# Patient Record
Sex: MALE | Race: WHITE | Employment: FULL TIME | URBAN - METROPOLITAN AREA
[De-identification: names, ages, dates, MRNs, and addresses within clinical notes are randomized per-mention and may not be internally consistent; named-entity substitution may affect disease eponyms.]

---

## 2021-07-24 ENCOUNTER — APPOINTMENT (OUTPATIENT)
Dept: ULTRASOUND IMAGING | Age: 66
DRG: 305 | End: 2021-07-24
Attending: INTERNAL MEDICINE
Payer: COMMERCIAL

## 2021-07-24 ENCOUNTER — APPOINTMENT (OUTPATIENT)
Dept: GENERAL RADIOLOGY | Age: 66
DRG: 305 | End: 2021-07-24
Attending: EMERGENCY MEDICINE
Payer: COMMERCIAL

## 2021-07-24 ENCOUNTER — APPOINTMENT (OUTPATIENT)
Dept: CT IMAGING | Age: 66
DRG: 305 | End: 2021-07-24
Attending: EMERGENCY MEDICINE
Payer: COMMERCIAL

## 2021-07-24 ENCOUNTER — HOSPITAL ENCOUNTER (INPATIENT)
Age: 66
LOS: 2 days | Discharge: HOME | DRG: 305 | End: 2021-07-26
Attending: EMERGENCY MEDICINE | Admitting: INTERNAL MEDICINE
Payer: COMMERCIAL

## 2021-07-24 DIAGNOSIS — R10.13 ABDOMINAL PAIN, EPIGASTRIC: ICD-10-CM

## 2021-07-24 DIAGNOSIS — I16.0 HYPERTENSIVE URGENCY: Primary | ICD-10-CM

## 2021-07-24 PROBLEM — I16.1 HYPERTENSIVE EMERGENCY: Status: ACTIVE | Noted: 2021-07-24

## 2021-07-24 LAB
ALBUMIN SERPL-MCNC: 4.4 G/DL (ref 3.5–5)
ALBUMIN/GLOB SERPL: 1.1 {RATIO} (ref 1.1–2.2)
ALP SERPL-CCNC: 40 U/L (ref 45–117)
ALT SERPL-CCNC: 17 U/L (ref 12–78)
ANION GAP SERPL CALC-SCNC: 14 MMOL/L (ref 5–15)
AST SERPL W P-5'-P-CCNC: 17 U/L (ref 15–37)
BASOPHILS # BLD: 0 K/UL (ref 0–0.1)
BASOPHILS NFR BLD: 0 % (ref 0–1)
BILIRUB SERPL-MCNC: 0.6 MG/DL (ref 0.2–1)
BUN SERPL-MCNC: 10 MG/DL (ref 6–20)
BUN/CREAT SERPL: 9 (ref 12–20)
CA-I BLD-MCNC: 9.6 MG/DL (ref 8.5–10.1)
CHLORIDE SERPL-SCNC: 99 MMOL/L (ref 97–108)
CO2 SERPL-SCNC: 26 MMOL/L (ref 21–32)
CREAT SERPL-MCNC: 1.08 MG/DL (ref 0.7–1.3)
D DIMER PPP FEU-MCNC: 0.52 MG/L FEU (ref 0.19–0.5)
DIFFERENTIAL METHOD BLD: ABNORMAL
EOSINOPHIL # BLD: 0 K/UL (ref 0–0.4)
EOSINOPHIL NFR BLD: 0 % (ref 0–7)
ERYTHROCYTE [DISTWIDTH] IN BLOOD BY AUTOMATED COUNT: 11.7 % (ref 11.5–14.5)
GLOBULIN SER CALC-MCNC: 3.9 G/DL (ref 2–4)
GLUCOSE SERPL-MCNC: 122 MG/DL (ref 65–100)
HCT VFR BLD AUTO: 41.1 % (ref 36.6–50.3)
HGB BLD-MCNC: 14.8 G/DL (ref 12.1–17)
IMM GRANULOCYTES # BLD AUTO: 0 K/UL (ref 0–0.04)
IMM GRANULOCYTES NFR BLD AUTO: 0 % (ref 0–0.5)
LIPASE SERPL-CCNC: 132 U/L (ref 73–393)
LYMPHOCYTES # BLD: 0.8 K/UL (ref 0.8–3.5)
LYMPHOCYTES NFR BLD: 8 % (ref 12–49)
MCH RBC QN AUTO: 32.6 PG (ref 26–34)
MCHC RBC AUTO-ENTMCNC: 36 G/DL (ref 30–36.5)
MCV RBC AUTO: 90.5 FL (ref 80–99)
MONOCYTES # BLD: 0.7 K/UL (ref 0–1)
MONOCYTES NFR BLD: 7 % (ref 5–13)
NEUTS SEG # BLD: 8.2 K/UL (ref 1.8–8)
NEUTS SEG NFR BLD: 85 % (ref 32–75)
PLATELET # BLD AUTO: 209 K/UL (ref 150–400)
PMV BLD AUTO: 8.8 FL (ref 8.9–12.9)
POTASSIUM SERPL-SCNC: 4 MMOL/L (ref 3.5–5.1)
PROT SERPL-MCNC: 8.3 G/DL (ref 6.4–8.2)
RBC # BLD AUTO: 4.54 M/UL (ref 4.1–5.7)
SODIUM SERPL-SCNC: 139 MMOL/L (ref 136–145)
TROPONIN I SERPL-MCNC: <0.05 NG/ML
WBC # BLD AUTO: 9.8 K/UL (ref 4.1–11.1)

## 2021-07-24 PROCEDURE — 96365 THER/PROPH/DIAG IV INF INIT: CPT

## 2021-07-24 PROCEDURE — 96366 THER/PROPH/DIAG IV INF ADDON: CPT

## 2021-07-24 PROCEDURE — 83690 ASSAY OF LIPASE: CPT

## 2021-07-24 PROCEDURE — 93005 ELECTROCARDIOGRAM TRACING: CPT

## 2021-07-24 PROCEDURE — 74011000250 HC RX REV CODE- 250: Performed by: INTERNAL MEDICINE

## 2021-07-24 PROCEDURE — 84484 ASSAY OF TROPONIN QUANT: CPT

## 2021-07-24 PROCEDURE — 74011250637 HC RX REV CODE- 250/637: Performed by: EMERGENCY MEDICINE

## 2021-07-24 PROCEDURE — 65610000006 HC RM INTENSIVE CARE

## 2021-07-24 PROCEDURE — 96375 TX/PRO/DX INJ NEW DRUG ADDON: CPT

## 2021-07-24 PROCEDURE — 99284 EMERGENCY DEPT VISIT MOD MDM: CPT

## 2021-07-24 PROCEDURE — 71045 X-RAY EXAM CHEST 1 VIEW: CPT

## 2021-07-24 PROCEDURE — 80053 COMPREHEN METABOLIC PANEL: CPT

## 2021-07-24 PROCEDURE — 74011000250 HC RX REV CODE- 250: Performed by: EMERGENCY MEDICINE

## 2021-07-24 PROCEDURE — 85025 COMPLETE CBC W/AUTO DIFF WBC: CPT

## 2021-07-24 PROCEDURE — 74011250636 HC RX REV CODE- 250/636: Performed by: INTERNAL MEDICINE

## 2021-07-24 PROCEDURE — 96374 THER/PROPH/DIAG INJ IV PUSH: CPT

## 2021-07-24 PROCEDURE — 85379 FIBRIN DEGRADATION QUANT: CPT

## 2021-07-24 PROCEDURE — 76705 ECHO EXAM OF ABDOMEN: CPT

## 2021-07-24 PROCEDURE — 96376 TX/PRO/DX INJ SAME DRUG ADON: CPT

## 2021-07-24 PROCEDURE — 74174 CTA ABD&PLVS W/CONTRAST: CPT

## 2021-07-24 PROCEDURE — 74011250636 HC RX REV CODE- 250/636: Performed by: EMERGENCY MEDICINE

## 2021-07-24 PROCEDURE — 74011000636 HC RX REV CODE- 636: Performed by: INTERNAL MEDICINE

## 2021-07-24 PROCEDURE — 36415 COLL VENOUS BLD VENIPUNCTURE: CPT

## 2021-07-24 PROCEDURE — 74011250637 HC RX REV CODE- 250/637: Performed by: INTERNAL MEDICINE

## 2021-07-24 PROCEDURE — C9113 INJ PANTOPRAZOLE SODIUM, VIA: HCPCS | Performed by: INTERNAL MEDICINE

## 2021-07-24 RX ORDER — ONDANSETRON 2 MG/ML
4 INJECTION INTRAMUSCULAR; INTRAVENOUS
Status: DISCONTINUED | OUTPATIENT
Start: 2021-07-24 | End: 2021-07-26 | Stop reason: HOSPADM

## 2021-07-24 RX ORDER — LABETALOL HCL 20 MG/4 ML
20 SYRINGE (ML) INTRAVENOUS
Status: DISCONTINUED | OUTPATIENT
Start: 2021-07-24 | End: 2021-07-26 | Stop reason: HOSPADM

## 2021-07-24 RX ORDER — SODIUM CHLORIDE 9 MG/ML
100 INJECTION, SOLUTION INTRAVENOUS CONTINUOUS
Status: DISCONTINUED | OUTPATIENT
Start: 2021-07-24 | End: 2021-07-26 | Stop reason: HOSPADM

## 2021-07-24 RX ORDER — SODIUM CHLORIDE 0.9 % (FLUSH) 0.9 %
5-40 SYRINGE (ML) INJECTION EVERY 8 HOURS
Status: DISCONTINUED | OUTPATIENT
Start: 2021-07-24 | End: 2021-07-26 | Stop reason: HOSPADM

## 2021-07-24 RX ORDER — MORPHINE SULFATE 4 MG/ML
4 INJECTION INTRAVENOUS
Status: COMPLETED | OUTPATIENT
Start: 2021-07-24 | End: 2021-07-24

## 2021-07-24 RX ORDER — HYDROMORPHONE HYDROCHLORIDE 1 MG/ML
1 INJECTION, SOLUTION INTRAMUSCULAR; INTRAVENOUS; SUBCUTANEOUS ONCE
Status: COMPLETED | OUTPATIENT
Start: 2021-07-24 | End: 2021-07-24

## 2021-07-24 RX ORDER — SUCRALFATE 1 G/1
1 TABLET ORAL
Status: COMPLETED | OUTPATIENT
Start: 2021-07-24 | End: 2021-07-24

## 2021-07-24 RX ORDER — FAMOTIDINE 10 MG/ML
20 INJECTION INTRAVENOUS
Status: COMPLETED | OUTPATIENT
Start: 2021-07-24 | End: 2021-07-24

## 2021-07-24 RX ORDER — NITROGLYCERIN 20 MG/100ML
5-20 INJECTION INTRAVENOUS
Status: DISCONTINUED | OUTPATIENT
Start: 2021-07-24 | End: 2021-07-24

## 2021-07-24 RX ORDER — ENOXAPARIN SODIUM 100 MG/ML
40 INJECTION SUBCUTANEOUS EVERY 24 HOURS
Status: DISCONTINUED | OUTPATIENT
Start: 2021-07-24 | End: 2021-07-26 | Stop reason: HOSPADM

## 2021-07-24 RX ORDER — MORPHINE SULFATE 2 MG/ML
2 INJECTION, SOLUTION INTRAMUSCULAR; INTRAVENOUS
Status: DISCONTINUED | OUTPATIENT
Start: 2021-07-24 | End: 2021-07-25

## 2021-07-24 RX ORDER — LISINOPRIL AND HYDROCHLOROTHIAZIDE 10; 12.5 MG/1; MG/1
1 TABLET ORAL DAILY
COMMUNITY

## 2021-07-24 RX ORDER — KETOROLAC TROMETHAMINE 30 MG/ML
15 INJECTION, SOLUTION INTRAMUSCULAR; INTRAVENOUS
Status: DISCONTINUED | OUTPATIENT
Start: 2021-07-24 | End: 2021-07-24

## 2021-07-24 RX ORDER — HYDROMORPHONE HYDROCHLORIDE 1 MG/ML
1 INJECTION, SOLUTION INTRAMUSCULAR; INTRAVENOUS; SUBCUTANEOUS
Status: COMPLETED | OUTPATIENT
Start: 2021-07-24 | End: 2021-07-24

## 2021-07-24 RX ORDER — SODIUM CHLORIDE 0.9 % (FLUSH) 0.9 %
5-40 SYRINGE (ML) INJECTION AS NEEDED
Status: DISCONTINUED | OUTPATIENT
Start: 2021-07-24 | End: 2021-07-26 | Stop reason: HOSPADM

## 2021-07-24 RX ORDER — NITROGLYCERIN 20 MG/100ML
0-20 INJECTION INTRAVENOUS
Status: DISCONTINUED | OUTPATIENT
Start: 2021-07-24 | End: 2021-07-26 | Stop reason: HOSPADM

## 2021-07-24 RX ORDER — HYDROMORPHONE HYDROCHLORIDE 1 MG/ML
2 INJECTION, SOLUTION INTRAMUSCULAR; INTRAVENOUS; SUBCUTANEOUS
Status: COMPLETED | OUTPATIENT
Start: 2021-07-24 | End: 2021-07-25

## 2021-07-24 RX ORDER — DIPHENHYDRAMINE HYDROCHLORIDE 50 MG/ML
25 INJECTION, SOLUTION INTRAMUSCULAR; INTRAVENOUS
Status: COMPLETED | OUTPATIENT
Start: 2021-07-24 | End: 2021-07-24

## 2021-07-24 RX ADMIN — ENOXAPARIN SODIUM 40 MG: 40 INJECTION SUBCUTANEOUS at 20:44

## 2021-07-24 RX ADMIN — LIDOCAINE HYDROCHLORIDE 30 ML: 20 SOLUTION OROPHARYNGEAL at 20:45

## 2021-07-24 RX ADMIN — MORPHINE SULFATE 4 MG: 4 INJECTION, SOLUTION INTRAMUSCULAR; INTRAVENOUS at 11:37

## 2021-07-24 RX ADMIN — IOPAMIDOL 100 ML: 755 INJECTION, SOLUTION INTRAVENOUS at 12:55

## 2021-07-24 RX ADMIN — ONDANSETRON 4 MG: 2 INJECTION INTRAMUSCULAR; INTRAVENOUS at 18:48

## 2021-07-24 RX ADMIN — MORPHINE SULFATE 4 MG: 4 INJECTION, SOLUTION INTRAMUSCULAR; INTRAVENOUS at 12:09

## 2021-07-24 RX ADMIN — SODIUM CHLORIDE 40 MG: 9 INJECTION, SOLUTION INTRAMUSCULAR; INTRAVENOUS; SUBCUTANEOUS at 20:01

## 2021-07-24 RX ADMIN — SUCRALFATE 1 G: 1 TABLET ORAL at 18:49

## 2021-07-24 RX ADMIN — FAMOTIDINE 20 MG: 10 INJECTION, SOLUTION INTRAVENOUS at 20:44

## 2021-07-24 RX ADMIN — SODIUM CHLORIDE 100 ML/HR: 9 INJECTION, SOLUTION INTRAVENOUS at 19:19

## 2021-07-24 RX ADMIN — ONDANSETRON 4 MG: 2 INJECTION INTRAMUSCULAR; INTRAVENOUS at 23:07

## 2021-07-24 RX ADMIN — NITROGLYCERIN 20 MCG/MIN: 20 INJECTION INTRAVENOUS at 12:11

## 2021-07-24 RX ADMIN — HYDROMORPHONE HYDROCHLORIDE 1 MG: 1 INJECTION, SOLUTION INTRAMUSCULAR; INTRAVENOUS; SUBCUTANEOUS at 18:48

## 2021-07-24 RX ADMIN — FAMOTIDINE 20 MG: 10 INJECTION, SOLUTION INTRAVENOUS at 12:07

## 2021-07-24 RX ADMIN — HYDROMORPHONE HYDROCHLORIDE 2 MG: 1 INJECTION, SOLUTION INTRAMUSCULAR; INTRAVENOUS; SUBCUTANEOUS at 23:07

## 2021-07-24 RX ADMIN — Medication 10 ML: at 22:00

## 2021-07-24 RX ADMIN — HYDROMORPHONE HYDROCHLORIDE 1 MG: 1 INJECTION, SOLUTION INTRAMUSCULAR; INTRAVENOUS; SUBCUTANEOUS at 13:39

## 2021-07-24 RX ADMIN — MORPHINE SULFATE 2 MG: 2 INJECTION, SOLUTION INTRAMUSCULAR; INTRAVENOUS at 20:44

## 2021-07-24 RX ADMIN — DIPHENHYDRAMINE HYDROCHLORIDE 25 MG: 50 INJECTION INTRAMUSCULAR; INTRAVENOUS at 11:38

## 2021-07-24 RX ADMIN — HYDROMORPHONE HYDROCHLORIDE 1 MG: 1 INJECTION, SOLUTION INTRAMUSCULAR; INTRAVENOUS; SUBCUTANEOUS at 17:00

## 2021-07-24 NOTE — H&P
GENERAL GENERIC H&P/CONSULT    Subjective:  51-year-old male with past medical history of hypertension, hypothyroidism. He started having nausea last night and around 9 AM this morning woke up with epigastric dull aching pain with associated intractable nausea and frequent belching. He was evaluated in the freestanding ER where he was found to be hypertensive, CTA chest abdomen done ruling out PE or dissection. Subsequently patient is initiated on nitroglycerin drip and sent to this hospital. Currently boarded in ICU is accompanied by wife and daughter. Still continues to be uncomfortable with epigastric pain, nausea and belching. No vomiting so far. Denies similar history in the past. Denies chest pain, shortness of breath or cough. Past Medical History:   Diagnosis Date    GERD (gastroesophageal reflux disease)     Hepatitis C     Hypertension     Hypothyroidism     Hypothyroidism     Porphyria ([address]       No past surgical history on file. Prior to Admission medications    Medication Sig Start Date End Date Taking? Authorizing Provider   lisinopril-hydroCHLOROthiazide (PRINZIDE, ZESTORETIC) 10-12.5 mg per tablet Take 1 Tablet by mouth daily. Yes Peggy Ballesteros MD   traZODone (DESYREL) 50 mg tablet Take 100 mg by mouth nightly as needed for Sleep. Provider, Historical   levothyroxine (SYNTHROID) 125 mcg tablet Take 125 mcg by mouth Daily (before breakfast). Peggy Ballesteros MD   hydroxychloroquine (PLAQUENIL) 200 mg tablet Take 200 mg by mouth two (2) days a week. Peggy Ballesteros MD   omeprazole (PRILOSEC) 20 mg capsule Take 20 mg by mouth daily.     Peggy Ballesteros MD     Allergies   Allergen Reactions    Codeine Itching    Percocet [Oxycodone-Acetaminophen] Itching      Social History     Tobacco Use    Smoking status: Current Every Day Smoker    Tobacco comment: pt states he smokes one cigar a day   Substance Use Topics    Alcohol use: No      Family History   Problem Relation Age of Onset    Other Father         possible porphyria    Other Other         nephew      Review of Systems   Constitutional: Negative for appetite change, chills, diaphoresis and fever. HENT: Negative. Eyes: Negative for pain and visual disturbance. Respiratory: Negative. Cardiovascular: Negative. Gastrointestinal: Positive for abdominal pain and nausea. Negative for blood in stool, constipation, diarrhea and vomiting. Endocrine: Negative. Genitourinary: Negative. Musculoskeletal: Negative. Skin: Negative. Neurological: Negative. Psychiatric/Behavioral: Negative. Objective:    No intake/output data recorded. No intake/output data recorded. Patient Vitals for the past 8 hrs:   BP Pulse Resp SpO2   07/24/21 1709 (!) 162/94 67     07/24/21 1623 (!) 187/94 86 18 100 %   07/24/21 1556 (!) 182/97   98 %   07/24/21 1539 (!) 180/107 76     07/24/21 1501 (!) 178/101 71     07/24/21 1437 (!) 180/104 70 16 100 %   07/24/21 1427 (!) 187/101 70  97 %   07/24/21 1411 (!) 192/105 68  98 %   07/24/21 1345 (!) 174/96 67  97 %   07/24/21 1328 (!) 164/96 76  99 %   07/24/21 1311 (!) 189/104 71     07/24/21 1224 (!) 212/110 72 18 97 %   07/24/21 1211 (!) 214/111 69     07/24/21 1203 (!) 205/111 67 16 98 %   07/24/21 1140 (!) 219/102 81 16      Physical Exam  Constitutional:       General: He is not in acute distress. Appearance: Normal appearance. Comments: Looks uncomfortable with nausea and pain   HENT:      Head: Normocephalic and atraumatic. Mouth/Throat:      Mouth: Mucous membranes are moist.      Pharynx: Oropharynx is clear. Eyes:      Extraocular Movements: Extraocular movements intact. Conjunctiva/sclera: Conjunctivae normal.      Pupils: Pupils are equal, round, and reactive to light. Cardiovascular:      Rate and Rhythm: Normal rate. Pulses: Normal pulses. Heart sounds: Normal heart sounds. No murmur heard. No friction rub. No gallop.     Pulmonary: Effort: Pulmonary effort is normal.      Breath sounds: Normal breath sounds. Abdominal:      General: Abdomen is flat. Bowel sounds are normal.      Palpations: Abdomen is soft. There is no mass. Tenderness: There is abdominal tenderness. There is no guarding or rebound. Comments: Epigastric tenderness, no guarding, rebound or rigidity. Negative Cat sign   Musculoskeletal:         General: Normal range of motion. Cervical back: Normal range of motion and neck supple. Skin:     General: Skin is warm and dry. Neurological:      General: No focal deficit present. Mental Status: He is alert and oriented to person, place, and time. Mental status is at baseline. Cranial Nerves: No cranial nerve deficit. Motor: No weakness. Psychiatric:         Mood and Affect: Mood normal.         Thought Content: Thought content normal.          Labs:    Recent Results (from the past 24 hour(s))   TROPONIN I    Collection Time: 07/24/21 11:30 AM   Result Value Ref Range    Troponin-I, Qt. <0.05 <0.05 ng/mL   CBC WITH AUTOMATED DIFF    Collection Time: 07/24/21 11:30 AM   Result Value Ref Range    WBC 9.8 4.1 - 11.1 K/uL    RBC 4.54 4.10 - 5.70 M/uL    HGB 14.8 12.1 - 17.0 g/dL    HCT 41.1 36.6 - 50.3 %    MCV 90.5 80.0 - 99.0 FL    MCH 32.6 26.0 - 34.0 PG    MCHC 36.0 30.0 - 36.5 g/dL    RDW 11.7 11.5 - 14.5 %    PLATELET 425 691 - 103 K/uL    MPV 8.8 (L) 8.9 - 12.9 FL    NEUTROPHILS 85 (H) 32 - 75 %    LYMPHOCYTES 8 (L) 12 - 49 %    MONOCYTES 7 5 - 13 %    EOSINOPHILS 0 0 - 7 %    BASOPHILS 0 0 - 1 %    IMMATURE GRANULOCYTES 0 0.0 - 0.5 %    ABS. NEUTROPHILS 8.2 (H) 1.8 - 8.0 K/UL    ABS. LYMPHOCYTES 0.8 0.8 - 3.5 K/UL    ABS. MONOCYTES 0.7 0.0 - 1.0 K/UL    ABS. EOSINOPHILS 0.0 0.0 - 0.4 K/UL    ABS. BASOPHILS 0.0 0.0 - 0.1 K/UL    ABS. IMM.  GRANS. 0.0 0.00 - 0.04 K/UL    DF AUTOMATED     METABOLIC PANEL, COMPREHENSIVE    Collection Time: 07/24/21 11:30 AM   Result Value Ref Range    Sodium 139 136 - 145 mmol/L    Potassium 4.0 3.5 - 5.1 mmol/L    Chloride 99 97 - 108 mmol/L    CO2 26 21 - 32 mmol/L    Anion gap 14 5 - 15 mmol/L    Glucose 122 (H) 65 - 100 mg/dL    BUN 10 6 - 20 mg/dL    Creatinine 1.08 0.70 - 1.30 mg/dL    BUN/Creatinine ratio 9 (L) 12 - 20      GFR est AA >60 >60 ml/min/1.73m2    GFR est non-AA >60 >60 ml/min/1.73m2    Calcium 9.6 8.5 - 10.1 mg/dL    Bilirubin, total 0.6 0.2 - 1.0 mg/dL    AST (SGOT) 17 15 - 37 U/L    ALT (SGPT) 17 12 - 78 U/L    Alk. phosphatase 40 (L) 45 - 117 U/L    Protein, total 8.3 (H) 6.4 - 8.2 g/dL    Albumin 4.4 3.5 - 5.0 g/dL    Globulin 3.9 2.0 - 4.0 g/dL    A-G Ratio 1.1 1.1 - 2.2     D DIMER    Collection Time: 07/24/21 11:43 AM   Result Value Ref Range    D-dimer 0.52 (H) 0.19 - 0.50 mg/L FEU   LIPASE    Collection Time: 07/24/21  3:00 PM   Result Value Ref Range    Lipase 132 73 - 393 U/L       Assessment:  Active Problems:    Hypertensive urgency (7/24/2021)      Hypertensive emergency (7/24/2021)    Hypertensive urgency rule out emergency  -No endorgan damage identified  -Continue with nitroglycerin drip, wean off as tolerated  -Home oral hypertensive is lisinopril/HCTZ    Acute gastritis  -Normal lipase, normal LFTs, normal bilirubin. CT abdomen unremarkable for acute findings, cholelithiasis seen.   -Right upper quadrant ultrasound is pending    Hypothyroidism  -Synthroid    DVT prophylaxis: Subcu Lovenox      Full code    Signed:  Maria C Dailey MD 7/24/2021

## 2021-07-24 NOTE — Clinical Note
Status[de-identified] INPATIENT [101]   Type of Bed: Intensive Care [6]   Inpatient Hospitalization Certified Necessary for the Following Reasons: 4.  Patient requires ICU level of care interventions (further clarification in H&P documentation)   Admitting Diagnosis: Hypertensive urgency [8614030]   Admitting Physician: Momo Bustos [[id number]]   Attending Physician: Momo Bustos [[id number]]   Estimated Length of Stay: 2 Midnights   Discharge Plan[de-identified] Home with Office Follow-up

## 2021-07-24 NOTE — PROGRESS NOTES
ICU admission and 4 eyes. Pt transferred to ICU from stand alone ED via [facility] critical care transport. Pt is HTN 190sBP. A and O x 4. Pt states extreme upper abdominal pain. Skin intact. Dr. Kip Rubio contacted, orders obtained for an ab ultrasound stat. Pt cannot stop burping. Pt on nitro drip for HTN, running 10 mcgs/min, concentration and rate two provider checked with paramedic for medication hand off.      Eunice PATEL

## 2021-07-25 ENCOUNTER — APPOINTMENT (OUTPATIENT)
Dept: ENDOSCOPY | Age: 66
DRG: 305 | End: 2021-07-25
Attending: INTERNAL MEDICINE
Payer: COMMERCIAL

## 2021-07-25 LAB
ALBUMIN SERPL-MCNC: 3.7 G/DL (ref 3.5–5)
ALBUMIN/GLOB SERPL: 1.1 {RATIO} (ref 1.1–2.2)
ALP SERPL-CCNC: 33 U/L (ref 45–117)
ALT SERPL-CCNC: 15 U/L (ref 12–78)
ANION GAP SERPL CALC-SCNC: 3 MMOL/L (ref 5–15)
AST SERPL W P-5'-P-CCNC: 16 U/L (ref 15–37)
ATRIAL RATE: 80 BPM
BASOPHILS # BLD: 0.1 K/UL (ref 0–0.1)
BASOPHILS NFR BLD: 1 % (ref 0–1)
BILIRUB SERPL-MCNC: 0.7 MG/DL (ref 0.2–1)
BUN SERPL-MCNC: 14 MG/DL (ref 6–20)
BUN/CREAT SERPL: 12 (ref 12–20)
CA-I BLD-MCNC: 8.6 MG/DL (ref 8.5–10.1)
CALCULATED P AXIS, ECG09: 73 DEGREES
CALCULATED R AXIS, ECG10: -7 DEGREES
CALCULATED T AXIS, ECG11: 32 DEGREES
CHLORIDE SERPL-SCNC: 104 MMOL/L (ref 97–108)
CO2 SERPL-SCNC: 30 MMOL/L (ref 21–32)
CREAT SERPL-MCNC: 1.16 MG/DL (ref 0.7–1.3)
DIAGNOSIS, 93000: NORMAL
DIFFERENTIAL METHOD BLD: ABNORMAL
EOSINOPHIL # BLD: 0 K/UL (ref 0–0.4)
EOSINOPHIL NFR BLD: 0 % (ref 0–7)
ERYTHROCYTE [DISTWIDTH] IN BLOOD BY AUTOMATED COUNT: 11.9 % (ref 11.5–14.5)
GLOBULIN SER CALC-MCNC: 3.3 G/DL (ref 2–4)
GLUCOSE SERPL-MCNC: 103 MG/DL (ref 65–100)
HCT VFR BLD AUTO: 36.6 % (ref 36.6–50.3)
HGB BLD-MCNC: 12.7 G/DL (ref 12.1–17)
IMM GRANULOCYTES # BLD AUTO: 0 K/UL (ref 0–0.04)
IMM GRANULOCYTES NFR BLD AUTO: 0 % (ref 0–0.5)
LYMPHOCYTES # BLD: 1.5 K/UL (ref 0.8–3.5)
LYMPHOCYTES NFR BLD: 13 % (ref 12–49)
MCH RBC QN AUTO: 31.8 PG (ref 26–34)
MCHC RBC AUTO-ENTMCNC: 34.7 G/DL (ref 30–36.5)
MCV RBC AUTO: 91.7 FL (ref 80–99)
MONOCYTES # BLD: 1.7 K/UL (ref 0–1)
MONOCYTES NFR BLD: 15 % (ref 5–13)
NEUTS SEG # BLD: 8 K/UL (ref 1.8–8)
NEUTS SEG NFR BLD: 71 % (ref 32–75)
NRBC # BLD: 0 K/UL (ref 0–0.01)
NRBC BLD-RTO: 0 PER 100 WBC
P-R INTERVAL, ECG05: 162 MS
PLATELET # BLD AUTO: 213 K/UL (ref 150–400)
PMV BLD AUTO: 9.6 FL (ref 8.9–12.9)
POTASSIUM SERPL-SCNC: 3.6 MMOL/L (ref 3.5–5.1)
PROT SERPL-MCNC: 7 G/DL (ref 6.4–8.2)
Q-T INTERVAL, ECG07: 408 MS
QRS DURATION, ECG06: 82 MS
QTC CALCULATION (BEZET), ECG08: 470 MS
RBC # BLD AUTO: 3.99 M/UL (ref 4.1–5.7)
SODIUM SERPL-SCNC: 137 MMOL/L (ref 136–145)
VENTRICULAR RATE, ECG03: 80 BPM
WBC # BLD AUTO: 11.4 K/UL (ref 4.1–11.1)

## 2021-07-25 PROCEDURE — 74011250637 HC RX REV CODE- 250/637: Performed by: INTERNAL MEDICINE

## 2021-07-25 PROCEDURE — 65610000006 HC RM INTENSIVE CARE

## 2021-07-25 PROCEDURE — 74011250636 HC RX REV CODE- 250/636: Performed by: INTERNAL MEDICINE

## 2021-07-25 PROCEDURE — C9113 INJ PANTOPRAZOLE SODIUM, VIA: HCPCS | Performed by: HOSPITALIST

## 2021-07-25 PROCEDURE — 80053 COMPREHEN METABOLIC PANEL: CPT

## 2021-07-25 PROCEDURE — 74011000250 HC RX REV CODE- 250: Performed by: INTERNAL MEDICINE

## 2021-07-25 PROCEDURE — 65270000029 HC RM PRIVATE

## 2021-07-25 PROCEDURE — 74011000250 HC RX REV CODE- 250: Performed by: HOSPITALIST

## 2021-07-25 PROCEDURE — 74011250637 HC RX REV CODE- 250/637: Performed by: HOSPITALIST

## 2021-07-25 PROCEDURE — 85025 COMPLETE CBC W/AUTO DIFF WBC: CPT

## 2021-07-25 PROCEDURE — 74011250636 HC RX REV CODE- 250/636: Performed by: HOSPITALIST

## 2021-07-25 PROCEDURE — 36415 COLL VENOUS BLD VENIPUNCTURE: CPT

## 2021-07-25 RX ORDER — HYDROCHLOROTHIAZIDE 25 MG/1
12.5 TABLET ORAL DAILY
Status: DISCONTINUED | OUTPATIENT
Start: 2021-07-25 | End: 2021-07-26 | Stop reason: HOSPADM

## 2021-07-25 RX ORDER — HYDROXYCHLOROQUINE SULFATE 200 MG/1
200 TABLET, FILM COATED ORAL
Status: DISCONTINUED | OUTPATIENT
Start: 2021-07-29 | End: 2021-07-25

## 2021-07-25 RX ORDER — MORPHINE SULFATE 2 MG/ML
1 INJECTION, SOLUTION INTRAMUSCULAR; INTRAVENOUS
Status: DISCONTINUED | OUTPATIENT
Start: 2021-07-25 | End: 2021-07-26 | Stop reason: HOSPADM

## 2021-07-25 RX ORDER — LISINOPRIL AND HYDROCHLOROTHIAZIDE 10; 12.5 MG/1; MG/1
1 TABLET ORAL DAILY
Status: DISCONTINUED | OUTPATIENT
Start: 2021-07-25 | End: 2021-07-25

## 2021-07-25 RX ORDER — LISINOPRIL 10 MG/1
10 TABLET ORAL DAILY
Status: DISCONTINUED | OUTPATIENT
Start: 2021-07-25 | End: 2021-07-26 | Stop reason: HOSPADM

## 2021-07-25 RX ORDER — SUCRALFATE 1 G/1
1 TABLET ORAL EVERY 6 HOURS
Status: DISCONTINUED | OUTPATIENT
Start: 2021-07-25 | End: 2021-07-26 | Stop reason: HOSPADM

## 2021-07-25 RX ORDER — TRAZODONE HYDROCHLORIDE 50 MG/1
100 TABLET ORAL
Status: DISCONTINUED | OUTPATIENT
Start: 2021-07-25 | End: 2021-07-26 | Stop reason: HOSPADM

## 2021-07-25 RX ORDER — IBUPROFEN 200 MG
1 TABLET ORAL DAILY
Status: DISCONTINUED | OUTPATIENT
Start: 2021-07-25 | End: 2021-07-26 | Stop reason: HOSPADM

## 2021-07-25 RX ORDER — LISINOPRIL AND HYDROCHLOROTHIAZIDE 10; 12.5 MG/1; MG/1
1 TABLET ORAL DAILY
Status: DISCONTINUED | OUTPATIENT
Start: 2021-07-25 | End: 2021-07-25 | Stop reason: SDUPTHER

## 2021-07-25 RX ADMIN — Medication 10 ML: at 05:40

## 2021-07-25 RX ADMIN — Medication 10 ML: at 22:37

## 2021-07-25 RX ADMIN — SUCRALFATE 1 G: 1 TABLET ORAL at 23:27

## 2021-07-25 RX ADMIN — ONDANSETRON 4 MG: 2 INJECTION INTRAMUSCULAR; INTRAVENOUS at 13:33

## 2021-07-25 RX ADMIN — MORPHINE SULFATE 2 MG: 2 INJECTION, SOLUTION INTRAMUSCULAR; INTRAVENOUS at 01:30

## 2021-07-25 RX ADMIN — SUCRALFATE 1 G: 1 TABLET ORAL at 11:12

## 2021-07-25 RX ADMIN — Medication 10 ML: at 18:49

## 2021-07-25 RX ADMIN — SODIUM CHLORIDE 100 ML/HR: 9 INJECTION, SOLUTION INTRAVENOUS at 05:40

## 2021-07-25 RX ADMIN — HYDROMORPHONE HYDROCHLORIDE 2 MG: 1 INJECTION, SOLUTION INTRAMUSCULAR; INTRAVENOUS; SUBCUTANEOUS at 03:08

## 2021-07-25 RX ADMIN — MORPHINE SULFATE 1 MG: 2 INJECTION, SOLUTION INTRAMUSCULAR; INTRAVENOUS at 15:23

## 2021-07-25 RX ADMIN — LIDOCAINE HYDROCHLORIDE 30 ML: 20 SOLUTION OROPHARYNGEAL at 23:29

## 2021-07-25 RX ADMIN — MORPHINE SULFATE 2 MG: 2 INJECTION, SOLUTION INTRAMUSCULAR; INTRAVENOUS at 05:40

## 2021-07-25 RX ADMIN — MORPHINE SULFATE 1 MG: 2 INJECTION, SOLUTION INTRAMUSCULAR; INTRAVENOUS at 13:33

## 2021-07-25 RX ADMIN — MORPHINE SULFATE 2 MG: 2 INJECTION, SOLUTION INTRAMUSCULAR; INTRAVENOUS at 07:38

## 2021-07-25 RX ADMIN — LISINOPRIL 10 MG: 10 TABLET ORAL at 10:15

## 2021-07-25 RX ADMIN — LIDOCAINE HYDROCHLORIDE 30 ML: 20 SOLUTION OROPHARYNGEAL at 03:10

## 2021-07-25 RX ADMIN — ONDANSETRON 4 MG: 2 INJECTION INTRAMUSCULAR; INTRAVENOUS at 19:42

## 2021-07-25 RX ADMIN — MORPHINE SULFATE 1 MG: 2 INJECTION, SOLUTION INTRAMUSCULAR; INTRAVENOUS at 19:42

## 2021-07-25 RX ADMIN — SODIUM CHLORIDE 40 MG: 9 INJECTION, SOLUTION INTRAMUSCULAR; INTRAVENOUS; SUBCUTANEOUS at 09:47

## 2021-07-25 RX ADMIN — MORPHINE SULFATE 1 MG: 2 INJECTION, SOLUTION INTRAMUSCULAR; INTRAVENOUS at 17:29

## 2021-07-25 RX ADMIN — SODIUM CHLORIDE 100 ML/HR: 9 INJECTION, SOLUTION INTRAVENOUS at 18:49

## 2021-07-25 RX ADMIN — MORPHINE SULFATE 2 MG: 2 INJECTION, SOLUTION INTRAMUSCULAR; INTRAVENOUS at 11:12

## 2021-07-25 RX ADMIN — MORPHINE SULFATE 1 MG: 2 INJECTION, SOLUTION INTRAMUSCULAR; INTRAVENOUS at 22:35

## 2021-07-25 RX ADMIN — SUCRALFATE 1 G: 1 TABLET ORAL at 17:29

## 2021-07-25 RX ADMIN — LEVOTHYROXINE SODIUM 125 MCG: 0.03 TABLET ORAL at 10:14

## 2021-07-25 RX ADMIN — ONDANSETRON 4 MG: 2 INJECTION INTRAMUSCULAR; INTRAVENOUS at 03:08

## 2021-07-25 NOTE — PROGRESS NOTES
Progress Note    Patient: Doneen Sandifer MRN: [id number]  SSN: [id number]    YOB: 1955  Age: 72 y.o. Sex: male      Admit Date: 7/24/2021    LOS: 1 day     Subjective:     65M, h/o HTN and hypothyroidism with epigastric pain    Likely PUD, h-pylori to be ruled out. CT chest no AAA, CT abdomen CBD 5mm, no cholecystitis, rest of the scan normal, no signs of GI bleeding        Review of Systems:  Constitutional: Negative for appetite change, chills, diaphoresis and fever. HENT: Negative. Eyes: Negative for pain and visual disturbance. Respiratory: Negative. Cardiovascular: Negative. Gastrointestinal: Positive for abdominal pain and nausea. Negative for blood in stool, constipation, diarrhea and vomiting. Endocrine: Negative. Genitourinary: Negative. Musculoskeletal: Negative. Skin: Negative. Neurological: Negative. Psychiatric/Behavioral: Negative. Objective:     Vitals:    07/25/21 0800 07/25/21 0900 07/25/21 1000 07/25/21 1100   BP: 107/81 104/80 (!) 134/112 123/69   Pulse: 67 70 66 76   Resp: 16 16 15 15   Temp:       SpO2: 97% 97% 97% 97%   Weight:       Height:            Intake and Output:  Current Shift: No intake/output data recorded. Last three shifts: 07/23 1901 - 07/25 0700  In: 1096.2 [I.V.:1096.2]  Out: -       Physical Exam:   Physical Exam  Constitutional:       General: He is not in acute distress. Appearance: Normal appearance. Comments: Looks uncomfortable with nausea and pain   HENT:      Head: Normocephalic and atraumatic. Mouth/Throat:      Mouth: Mucous membranes are moist.      Pharynx: Oropharynx is clear. Eyes:      Extraocular Movements: Extraocular movements intact. Conjunctiva/sclera: Conjunctivae normal.      Pupils: Pupils are equal, round, and reactive to light. Cardiovascular:      Rate and Rhythm: Normal rate. Pulses: Normal pulses. Heart sounds: Normal heart sounds. No murmur heard.    No friction rub. No gallop. Pulmonary:      Effort: Pulmonary effort is normal.      Breath sounds: Normal breath sounds. Abdominal:      General: Abdomen is flat. Bowel sounds are normal.      Palpations: Abdomen is soft. There is no mass. Tenderness: There is abdominal tenderness. There is no guarding or rebound. Comments: Epigastric tenderness, no guarding, rebound or rigidity. Negative Cat sign   Musculoskeletal:         General: Normal range of motion. Cervical back: Normal range of motion and neck supple. Skin:     General: Skin is warm and dry. Neurological:      General: No focal deficit present. Mental Status: He is alert and oriented to person, place, and time. Mental status is at baseline. Cranial Nerves: No cranial nerve deficit. Motor: No weakness. Psychiatric:         Mood and Affect: Mood normal.         Thought Content: Thought content normal.       Lab/Data Review:  Recent Results (from the past 24 hour(s))   LIPASE    Collection Time: 07/24/21  3:00 PM   Result Value Ref Range    Lipase 132 73 - 433 U/L   METABOLIC PANEL, COMPREHENSIVE    Collection Time: 07/25/21  4:55 AM   Result Value Ref Range    Sodium 137 136 - 145 mmol/L    Potassium 3.6 3.5 - 5.1 mmol/L    Chloride 104 97 - 108 mmol/L    CO2 30 21 - 32 mmol/L    Anion gap 3 (L) 5 - 15 mmol/L    Glucose 103 (H) 65 - 100 mg/dL    BUN 14 6 - 20 mg/dL    Creatinine 1.16 0.70 - 1.30 mg/dL    BUN/Creatinine ratio 12 12 - 20      GFR est AA >60 >60 ml/min/1.73m2    GFR est non-AA >60 >60 ml/min/1.73m2    Calcium 8.6 8.5 - 10.1 mg/dL    Bilirubin, total 0.7 0.2 - 1.0 mg/dL    AST (SGOT) 16 15 - 37 U/L    ALT (SGPT) 15 12 - 78 U/L    Alk.  phosphatase 33 (L) 45 - 117 U/L    Protein, total 7.0 6.4 - 8.2 g/dL    Albumin 3.7 3.5 - 5.0 g/dL    Globulin 3.3 2.0 - 4.0 g/dL    A-G Ratio 1.1 1.1 - 2.2     CBC WITH AUTOMATED DIFF    Collection Time: 07/25/21  4:55 AM   Result Value Ref Range    WBC 11.4 (H) 4.1 - 11.1 K/uL    RBC 3.99 (L) 4.10 - 5.70 M/uL    HGB 12.7 12.1 - 17.0 g/dL    HCT 36.6 36.6 - 50.3 %    MCV 91.7 80.0 - 99.0 FL    MCH 31.8 26.0 - 34.0 PG    MCHC 34.7 30.0 - 36.5 g/dL    RDW 11.9 11.5 - 14.5 %    PLATELET 749 371 - 708 K/uL    MPV 9.6 8.9 - 12.9 FL    NRBC 0.0 0.0  WBC    ABSOLUTE NRBC 0.00 0.00 - 0.01 K/uL    NEUTROPHILS 71 32 - 75 %    LYMPHOCYTES 13 12 - 49 %    MONOCYTES 15 (H) 5 - 13 %    EOSINOPHILS 0 0 - 7 %    BASOPHILS 1 0 - 1 %    IMMATURE GRANULOCYTES 0 0 - 0.5 %    ABS. NEUTROPHILS 8.0 1.8 - 8.0 K/UL    ABS. LYMPHOCYTES 1.5 0.8 - 3.5 K/UL    ABS. MONOCYTES 1.7 (H) 0.0 - 1.0 K/UL    ABS. EOSINOPHILS 0.0 0.0 - 0.4 K/UL    ABS. BASOPHILS 0.1 0.0 - 0.1 K/UL    ABS. IMM. GRANS. 0.0 0.00 - 0.04 K/UL    DF AUTOMATED           Assessment and plan:      (1) probable PUD: IV Protonix and Carafate. Consult GI for possible EGD for h-pylori. Morphine for pain, zofran, GI cocktail. Order UDS. Continue NPO with sips of water. (2) HTN urgency: CT chest and abdomen- no AAA, no cholecystitis. Resume HCTZ and lisinopril, transfer to floor with PRN labetalol for BP> 359 systolic    (3) hypothyroidism: synthroid.  Stable    (4) tobacco abuse: complicates all aspects of care    DVT ppx: lovenox     DISPO: home once stable likely in 1-2 days, after GI eval    Signed By: Cuba Pelaez MD     July 25, 2021

## 2021-07-25 NOTE — CONSULTS
Reason for consult: Epigastric pain and burping    History:  Patient is a 25-year-old gentleman who presented to the emergency department yesterday evening with a 1 day history of epigastric pain associated nausea and belching. In the freestanding emergency department he was found to be significantly hypertensive. He had a CTA of the chest and abdomen done to rule out PE or dissection. He was transferred here to [address] [place] [address] to the intensive care unit and started on a nitroglycerin drip. His blood pressure is better controlled now however he continues to have significant epigastric pain and belching. He says he is never experienced this before. He denies any vomiting but does have intermittent nausea. He states when he gets morphine belching goes away. He also says Zofran helps. He has a history of hepatitis C. In addition he has a history of GERD. He does take Prilosec at home. Last colonoscopy was in 2016. He states he believes at the same time he had an upper endoscopy and both were normal.    Physical exam:  Abdomen is soft, nondistended, nontender palpation. Labs:  Recent Results (from the past 24 hour(s))   LIPASE    Collection Time: 07/24/21  3:00 PM   Result Value Ref Range    Lipase 132 73 - 300 U/L   METABOLIC PANEL, COMPREHENSIVE    Collection Time: 07/25/21  4:55 AM   Result Value Ref Range    Sodium 137 136 - 145 mmol/L    Potassium 3.6 3.5 - 5.1 mmol/L    Chloride 104 97 - 108 mmol/L    CO2 30 21 - 32 mmol/L    Anion gap 3 (L) 5 - 15 mmol/L    Glucose 103 (H) 65 - 100 mg/dL    BUN 14 6 - 20 mg/dL    Creatinine 1.16 0.70 - 1.30 mg/dL    BUN/Creatinine ratio 12 12 - 20      GFR est AA >60 >60 ml/min/1.73m2    GFR est non-AA >60 >60 ml/min/1.73m2    Calcium 8.6 8.5 - 10.1 mg/dL    Bilirubin, total 0.7 0.2 - 1.0 mg/dL    AST (SGOT) 16 15 - 37 U/L    ALT (SGPT) 15 12 - 78 U/L    Alk.  phosphatase 33 (L) 45 - 117 U/L    Protein, total 7.0 6.4 - 8.2 g/dL    Albumin 3.7 3.5 - 5.0 g/dL    Globulin 3.3 2.0 - 4.0 g/dL    A-G Ratio 1.1 1.1 - 2.2     CBC WITH AUTOMATED DIFF    Collection Time: 07/25/21  4:55 AM   Result Value Ref Range    WBC 11.4 (H) 4.1 - 11.1 K/uL    RBC 3.99 (L) 4.10 - 5.70 M/uL    HGB 12.7 12.1 - 17.0 g/dL    HCT 36.6 36.6 - 50.3 %    MCV 91.7 80.0 - 99.0 FL    MCH 31.8 26.0 - 34.0 PG    MCHC 34.7 30.0 - 36.5 g/dL    RDW 11.9 11.5 - 14.5 %    PLATELET 960 362 - 662 K/uL    MPV 9.6 8.9 - 12.9 FL    NRBC 0.0 0.0  WBC    ABSOLUTE NRBC 0.00 0.00 - 0.01 K/uL    NEUTROPHILS 71 32 - 75 %    LYMPHOCYTES 13 12 - 49 %    MONOCYTES 15 (H) 5 - 13 %    EOSINOPHILS 0 0 - 7 %    BASOPHILS 1 0 - 1 %    IMMATURE GRANULOCYTES 0 0 - 0.5 %    ABS. NEUTROPHILS 8.0 1.8 - 8.0 K/UL    ABS. LYMPHOCYTES 1.5 0.8 - 3.5 K/UL    ABS. MONOCYTES 1.7 (H) 0.0 - 1.0 K/UL    ABS. EOSINOPHILS 0.0 0.0 - 0.4 K/UL    ABS. BASOPHILS 0.1 0.0 - 0.1 K/UL    ABS. IMM. GRANS. 0.0 0.00 - 0.04 K/UL    DF AUTOMATED       CT scan chest abdomen pelvis:  IMPRESSION     1. No evidence of aneurysm or dissection.     2. Cholelithiasis without evidence of cholecystitis.      3. Moderate changed fecal material.     Assessment and plan:  42-year-old gentleman admitted with malignant hypertension, epigastric pain, belching. Blood pressure is better controlled. Patient has been seen by gastroenterology who will be performing EGD tomorrow. There is no acute surgical issue. Please reconsult as needed.

## 2021-07-25 NOTE — PROGRESS NOTES
Spiritual Care Assessment/Progress Note  [facility]      NAME: Jose Handy      MRN: [id number]  AGE: 72 y.o. SEX: male  Voodoo Affiliation: Bahai   Language: English     7/25/2021     Total Time (in minutes): 10     Spiritual Assessment begun in [facility] through conversation with:         []Patient        [x] Family    [] Friend(s)        Reason for Consult: Initial/Spiritual assessment, critical care     Spiritual beliefs: (Please include comment if needed)     [] Identifies with a radha tradition:         [] Supported by a radha community:            [] Claims no spiritual orientation:           [] Seeking spiritual identity:                [] Adheres to an individual form of spirituality:           [x] Not able to assess:                           Identified resources for coping:      [] Prayer                               [] Music                  [] Guided Imagery     [] Family/friends                 [] Pet visits     [] Devotional reading                         [x] Unknown     [] Other:                                             Interventions offered during this visit: (See comments for more details)    Patient Interventions:  Other (comment) (Silent support and prayer)     Family/Friend(s): Catharsis/review of pertinent events in supportive environment, Coping skills reviewed/reinforced, Initial Assessment, Normalization of emotional/spiritual concerns, Prayer (assurance of)     Plan of Care:     [] Support spiritual and/or cultural needs    [] Support AMD and/or advance care planning process      [] Support grieving process   [] Coordinate Rites and/or Rituals    [] Coordination with community clergy   [] No spiritual needs identified at this time   [] Detailed Plan of Care below (See Comments)  [] Make referral to Music Therapy  [] Make referral to Pet Therapy     [] Make referral to Addiction services  [] Make referral to [facility]  [] Make referral to Spiritual Care Partner  [] No future visits requested        [x] Follow up upon further referrals     Comments:  Visited with patient's family for initial assessment in the hallway. They stated they were doing well and did not seem distressed. Provided chaplaincy education, listened with empathy and normalized their experienced. Assured of prayer and advised of  availability. Chaplains will follow up if further referrals are requested. pina Blood M.Div.    can be reached by calling the  at [facility]  [phone]

## 2021-07-25 NOTE — CONSULTS
Consult    Patient: Anthony Chung MRN: [id number]  SSN: [id number]    YOB: 1955  Age: 72 y.o. Sex: male      Subjective:      Anthony Chung is a 72 y.o. male who is being seen for Abdominal pain  Patient come to emergency room with new onset sudden chest pain, abdominal pain,nausea last night  intractable nausea and frequent belching. He was evaluated in the freestanding ER,where CTA chest abdomen done ruling out PE or dissection. Patient Was drug, the pain B's control with medication,Still continues to be uncomfortable with epigastric pain, nausea and belching. No vomiting, No hematemesis no melena,  At no episode like this before, history of fibrosis, HCV, has been treated with HCV treatment before  Past Medical History:   Diagnosis Date    GERD (gastroesophageal reflux disease)     Hepatitis C     Hypertension     Hypothyroidism     Hypothyroidism     Porphyria ([address]      No past surgical history on file.    Family History   Problem Relation Age of Onset    Other Father         possible porphyria    Other Other         nephew     Social History     Tobacco Use    Smoking status: Current Every Day Smoker    Tobacco comment: pt states he smokes one cigar a day   Substance Use Topics    Alcohol use: No      Current Facility-Administered Medications   Medication Dose Route Frequency Provider Last Rate Last Admin    traZODone (DESYREL) tablet 100 mg  100 mg Oral QHS PRN Bon Aviles MD        levothyroxine (SYNTHROID) tablet 125 mcg  125 mcg Oral ACB Porsha Orozco MD   125 mcg at 07/25/21 1014    nicotine (NICODERM CQ) 21 mg/24 hr patch 1 Patch  1 Patch TransDERmal DAILY Yamile Watson MD        lisinopriL (PRINIVIL, ZESTRIL) tablet 10 mg  10 mg Oral DAILY Porsha Orozco MD   10 mg at 07/25/21 1015    Or    hydroCHLOROthiazide (HYDRODIURIL) tablet 12.5 mg  12.5 mg Oral DAILY Porsha Orozco MD        sucralfate (CARAFATE) tablet 1 g  1 g Oral Q6H Porsha Orozco MD   1 g at 07/25/21 1112    morphine injection 1 mg  1 mg IntraVENous Q2H PRN Porsha Orozco MD   1 mg at 07/25/21 1523    sodium chloride (NS) flush 5-40 mL  5-40 mL IntraVENous Q8H Bon Aviles MD   10 mL at 07/25/21 0540    sodium chloride (NS) flush 5-40 mL  5-40 mL IntraVENous PRN Bon Aviles MD        0.9% sodium chloride infusion  100 mL/hr IntraVENous CONTINUOUS Bon Aviles  mL/hr at 07/25/21 0540 100 mL/hr at 07/25/21 0540    acetaminophen (TYLENOL) solution 650 mg  650 mg Oral Q6H PRN Bon Aviles MD        ondansetron [facility]) injection 4 mg  4 mg IntraVENous Q4H PRN Bon Aviles MD   4 mg at 07/25/21 1333    enoxaparin (LOVENOX) injection 40 mg  40 mg SubCUTAneous Q24H Bon Aviles MD   40 mg at 07/24/21 2044    nitroglycerin (Tridil) 200 mcg/ml infusion  0-20 mcg/min IntraVENous TITRATE Bon Aviles MD   Held at 07/25/21 0400    labetaloL (NORMODYNE;TRANDATE) 20 mg/4 mL (5 mg/mL) injection 20 mg  20 mg IntraVENous Q3H PRN Bon Aviles MD        maalox/viscous lidocaine/diphenhydramine (GI COCKTAIL) oral solution 30 mL  30 mL Oral Q4H PRN Tyesha Prado MD   30 mL at 07/25/21 0310        Allergies   Allergen Reactions    Codeine Itching    Percocet [Oxycodone-Acetaminophen] Itching       Review of Systems:  Review of Systems   Constitutional: Negative. HENT: Negative. Eyes: Negative. Cardiovascular: Positive for chest pain. Gastrointestinal: Positive for abdominal pain, heartburn and nausea. Genitourinary: Negative for urgency. Musculoskeletal: Negative for neck pain. Neurological: Negative.          Objective:     Vitals:    07/25/21 0900 07/25/21 1000 07/25/21 1100 07/25/21 1500   BP: 104/80 (!) 134/112 123/69 (!) 145/68   Pulse: 70 66 76 71   Resp: 16 15 15 12   Temp:       SpO2: 97% 97% 97% 99%   Weight:       Height:            Physical Exam:  Physical Exam  Constitutional:       Appearance: Normal appearance. HENT:      Head: Atraumatic. Mouth/Throat:      Mouth: Mucous membranes are moist.   Eyes:      Conjunctiva/sclera: Conjunctivae normal.      Pupils: Pupils are equal, round, and reactive to light. Cardiovascular:      Rate and Rhythm: Tachycardia present. Pulses: Normal pulses. Pulmonary:      Effort: Pulmonary effort is normal.      Breath sounds: Normal breath sounds. Abdominal:      General: Abdomen is flat. Bowel sounds are normal.      Palpations: There is no mass. Tenderness: There is abdominal tenderness. There is no rebound. Hernia: No hernia is present. Musculoskeletal:      Cervical back: Normal range of motion. Skin:     General: Skin is warm. Coloration: Skin is not jaundiced. Findings: No lesion. Neurological:      General: No focal deficit present. Psychiatric:         Mood and Affect: Mood normal.          Recent Results (from the past 24 hour(s))   METABOLIC PANEL, COMPREHENSIVE    Collection Time: 07/25/21  4:55 AM   Result Value Ref Range    Sodium 137 136 - 145 mmol/L    Potassium 3.6 3.5 - 5.1 mmol/L    Chloride 104 97 - 108 mmol/L    CO2 30 21 - 32 mmol/L    Anion gap 3 (L) 5 - 15 mmol/L    Glucose 103 (H) 65 - 100 mg/dL    BUN 14 6 - 20 mg/dL    Creatinine 1.16 0.70 - 1.30 mg/dL    BUN/Creatinine ratio 12 12 - 20      GFR est AA >60 >60 ml/min/1.73m2    GFR est non-AA >60 >60 ml/min/1.73m2    Calcium 8.6 8.5 - 10.1 mg/dL    Bilirubin, total 0.7 0.2 - 1.0 mg/dL    AST (SGOT) 16 15 - 37 U/L    ALT (SGPT) 15 12 - 78 U/L    Alk.  phosphatase 33 (L) 45 - 117 U/L    Protein, total 7.0 6.4 - 8.2 g/dL    Albumin 3.7 3.5 - 5.0 g/dL    Globulin 3.3 2.0 - 4.0 g/dL    A-G Ratio 1.1 1.1 - 2.2     CBC WITH AUTOMATED DIFF    Collection Time: 07/25/21  4:55 AM   Result Value Ref Range    WBC 11.4 (H) 4.1 - 11.1 K/uL    RBC 3.99 (L) 4.10 - 5.70 M/uL    HGB 12.7 12.1 - 17.0 g/dL    HCT 36.6 36.6 - 50.3 %    MCV 91.7 80.0 - 99.0 FL    MCH 31.8 26.0 - 34.0 PG    MCHC 34.7 30.0 - 36.5 g/dL    RDW 11.9 11.5 - 14.5 %    PLATELET 416 516 - 775 K/uL    MPV 9.6 8.9 - 12.9 FL    NRBC 0.0 0.0  WBC    ABSOLUTE NRBC 0.00 0.00 - 0.01 K/uL    NEUTROPHILS 71 32 - 75 %    LYMPHOCYTES 13 12 - 49 %    MONOCYTES 15 (H) 5 - 13 %    EOSINOPHILS 0 0 - 7 %    BASOPHILS 1 0 - 1 %    IMMATURE GRANULOCYTES 0 0 - 0.5 %    ABS. NEUTROPHILS 8.0 1.8 - 8.0 K/UL    ABS. LYMPHOCYTES 1.5 0.8 - 3.5 K/UL    ABS. MONOCYTES 1.7 (H) 0.0 - 1.0 K/UL    ABS. EOSINOPHILS 0.0 0.0 - 0.4 K/UL    ABS. BASOPHILS 0.1 0.0 - 0.1 K/UL    ABS. IMM. GRANS. 0.0 0.00 - 0.04 K/UL    DF AUTOMATED          US GALLBLADDER   Final Result   Cholelithiasis without cystitis or biliary dilatation. CTA CHEST ABD PELV W CONT   Final Result      1. No evidence of aneurysm or dissection. 2. Cholelithiasis without evidence of cholecystitis. 3. Moderate changed fecal material.            CT dose reduction was achieved through use of a standardized protocol tailored   for this examination and automatic exposure control for dose modulation. XR CHEST SNGL V   Final Result   No findings of acute cardiopulmonary abnormality.          Assessment:     Hospital Problems  Date Reviewed: 4/5/2016        Codes Class Noted POA    Hypertensive urgency ICD-10-CM: I16.0  ICD-9-CM: 401.9  7/24/2021 Unknown        Hypertensive emergency ICD-10-CM: I16.1  ICD-9-CM: 401.9  7/24/2021 Unknown          abdominal pain, nausea,  No evidence of dissection, PE, pneumothorax, cardiac exam are normal,    No GI bleeding,no history of heavy also disease, no Whit-Rossi,  CT showed of the cysts the sinuses, however his lipase, liver function tests all normal  History of hepatitis C, cirrhosis,  Plan:   Continued ICUmonitor closely  May start on full liquid soft diet  Surgery consultation note is noted  Symptomatic treatment  ppi as noted  Age he was scheduled motor evaluation, the indication, risks, options and limitations discussed with patient, family member  Signed By: Edvin De La Rosa MD     July 25, 2021         Thank you for allowing me to participate in this patients care  Cc Referring Physician   Colette Miller MD

## 2021-07-25 NOTE — PROGRESS NOTES
Problem: Patient Education: Go to Patient Education Activity  Goal: Patient/Family Education  7/24/2021 2021 by Tere Flores RN  Outcome: Progressing Towards Goal  7/24/2021 2020 by Tere Flores RN  Outcome: Progressing Towards Goal

## 2021-07-26 ENCOUNTER — ANESTHESIA EVENT (OUTPATIENT)
Dept: ENDOSCOPY | Age: 66
DRG: 305 | End: 2021-07-26
Payer: COMMERCIAL

## 2021-07-26 ENCOUNTER — ANESTHESIA (OUTPATIENT)
Dept: ENDOSCOPY | Age: 66
DRG: 305 | End: 2021-07-26
Payer: COMMERCIAL

## 2021-07-26 VITALS
HEART RATE: 54 BPM | DIASTOLIC BLOOD PRESSURE: 73 MMHG | HEIGHT: 68 IN | WEIGHT: 160 LBS | OXYGEN SATURATION: 98 % | SYSTOLIC BLOOD PRESSURE: 135 MMHG | BODY MASS INDEX: 24.25 KG/M2 | RESPIRATION RATE: 18 BRPM | TEMPERATURE: 98.8 F

## 2021-07-26 LAB
H PYLORI FROM TISSUE: NEGATIVE
KIT LOT NO., HCLOLOT: NORMAL
NEGATIVE CONTROL: NEGATIVE
POSITIVE CONTROL: POSITIVE

## 2021-07-26 PROCEDURE — 76060000032 HC ANESTHESIA 0.5 TO 1 HR: Performed by: INTERNAL MEDICINE

## 2021-07-26 PROCEDURE — 76040000007: Performed by: INTERNAL MEDICINE

## 2021-07-26 PROCEDURE — 0DB78ZX EXCISION OF STOMACH, PYLORUS, VIA NATURAL OR ARTIFICIAL OPENING ENDOSCOPIC, DIAGNOSTIC: ICD-10-PCS | Performed by: INTERNAL MEDICINE

## 2021-07-26 PROCEDURE — 77030021593 HC FCPS BIOP ENDOSC BSC -A: Performed by: INTERNAL MEDICINE

## 2021-07-26 PROCEDURE — 74011250636 HC RX REV CODE- 250/636: Performed by: INTERNAL MEDICINE

## 2021-07-26 PROCEDURE — 74011250636 HC RX REV CODE- 250/636: Performed by: ANESTHESIOLOGY

## 2021-07-26 PROCEDURE — 87077 CULTURE AEROBIC IDENTIFY: CPT | Performed by: INTERNAL MEDICINE

## 2021-07-26 PROCEDURE — 74011250636 HC RX REV CODE- 250/636: Performed by: HOSPITALIST

## 2021-07-26 PROCEDURE — 2709999900 HC NON-CHARGEABLE SUPPLY: Performed by: INTERNAL MEDICINE

## 2021-07-26 PROCEDURE — 74011250637 HC RX REV CODE- 250/637: Performed by: HOSPITALIST

## 2021-07-26 RX ORDER — SUCRALFATE 1 G/1
1 TABLET ORAL EVERY 6 HOURS
Qty: 60 TABLET | Refills: 0 | Status: SHIPPED | OUTPATIENT
Start: 2021-07-26 | End: 2021-08-10

## 2021-07-26 RX ORDER — PROPOFOL 10 MG/ML
INJECTION, EMULSION INTRAVENOUS AS NEEDED
Status: DISCONTINUED | OUTPATIENT
Start: 2021-07-26 | End: 2021-07-26 | Stop reason: HOSPADM

## 2021-07-26 RX ORDER — PANTOPRAZOLE SODIUM 40 MG/1
40 TABLET, DELAYED RELEASE ORAL 2 TIMES DAILY
Qty: 60 TABLET | Refills: 0 | Status: SHIPPED | OUTPATIENT
Start: 2021-07-26

## 2021-07-26 RX ORDER — SODIUM CHLORIDE 9 MG/ML
INJECTION, SOLUTION INTRAVENOUS
Status: DISCONTINUED | OUTPATIENT
Start: 2021-07-26 | End: 2021-07-26 | Stop reason: HOSPADM

## 2021-07-26 RX ADMIN — PROPOFOL 70 MG: 10 INJECTION, EMULSION INTRAVENOUS at 12:49

## 2021-07-26 RX ADMIN — SODIUM CHLORIDE: 9 INJECTION, SOLUTION INTRAVENOUS at 12:49

## 2021-07-26 RX ADMIN — Medication 10 ML: at 05:48

## 2021-07-26 RX ADMIN — MORPHINE SULFATE 1 MG: 2 INJECTION, SOLUTION INTRAMUSCULAR; INTRAVENOUS at 01:39

## 2021-07-26 RX ADMIN — SODIUM CHLORIDE 100 ML/HR: 9 INJECTION, SOLUTION INTRAVENOUS at 05:45

## 2021-07-26 RX ADMIN — SUCRALFATE 1 G: 1 TABLET ORAL at 13:31

## 2021-07-26 RX ADMIN — LISINOPRIL 10 MG: 10 TABLET ORAL at 13:29

## 2021-07-26 RX ADMIN — Medication 10 ML: at 14:00

## 2021-07-26 RX ADMIN — MORPHINE SULFATE 1 MG: 2 INJECTION, SOLUTION INTRAMUSCULAR; INTRAVENOUS at 09:07

## 2021-07-26 NOTE — PROGRESS NOTES
Hospitalist Progress Note               Daily Progress Note: 7/26/2021      Subjective:   Hospital course to date:  Patient is seen for follow-up. He is a 42-year-old male, very healthy and physically active who was admitted on 7/24 with severe epigastric pain and hypertensive urgency, requiring IV nitroglycerin. CTA of the chest and abdomen was negative for aneurysm but did show gallstones. Ultrasound was negative for cholecystitis. His blood pressure has improved.    ---------    Patient is seen today for follow-up. He notes his abdominal pain is less intense.   EGD is planned for today      Problem List:  Problem List as of 7/26/2021 Date Reviewed: 4/5/2016        Codes Class Noted - Resolved    Hypertensive urgency ICD-10-CM: I16.0  ICD-9-CM: 401.9  7/24/2021 - Present        Hypertensive emergency ICD-10-CM: I16.1  ICD-9-CM: 401.9  7/24/2021 - Present        Chronic hepatitis C ([address] ICD-10-CM: B18.2  ICD-9-CM: 070.54  4/5/2016 - Present        Cellulitis ICD-10-CM: L03.90  ICD-9-CM: 682.9  7/2/2013 - Present        Hypothyroidism (Chronic) ICD-10-CM: E03.9  ICD-9-CM: 244.9  7/2/2013 - Present        Fever, unspecified ICD-10-CM: R50.9  ICD-9-CM: 780.60  7/2/2013 - Present        Leukopenia ICD-10-CM: D72.819  ICD-9-CM: 288.50  7/2/2013 - Present        SIRS (systemic inflammatory response syndrome) ([address] ICD-10-CM: R65.10  ICD-9-CM: 995.90  7/2/2013 - Present              Medications reviewed  Current Facility-Administered Medications   Medication Dose Route Frequency    traZODone (DESYREL) tablet 100 mg  100 mg Oral QHS PRN    levothyroxine (SYNTHROID) tablet 125 mcg  125 mcg Oral ACB    nicotine (NICODERM CQ) 21 mg/24 hr patch 1 Patch  1 Patch TransDERmal DAILY    lisinopriL (PRINIVIL, ZESTRIL) tablet 10 mg  10 mg Oral DAILY    Or    hydroCHLOROthiazide (HYDRODIURIL) tablet 12.5 mg  12.5 mg Oral DAILY    sucralfate (CARAFATE) tablet 1 g  1 g Oral Q6H    morphine injection 1 mg  1 mg IntraVENous Q2H PRN    sodium chloride (NS) flush 5-40 mL  5-40 mL IntraVENous Q8H    sodium chloride (NS) flush 5-40 mL  5-40 mL IntraVENous PRN    0.9% sodium chloride infusion  100 mL/hr IntraVENous CONTINUOUS    acetaminophen (TYLENOL) solution 650 mg  650 mg Oral Q6H PRN    ondansetron (ZOFRAN) injection 4 mg  4 mg IntraVENous Q4H PRN    [Held by provider] enoxaparin (LOVENOX) injection 40 mg  40 mg SubCUTAneous Q24H    nitroglycerin (Tridil) 200 mcg/ml infusion  0-20 mcg/min IntraVENous TITRATE    labetaloL (NORMODYNE;TRANDATE) 20 mg/4 mL (5 mg/mL) injection 20 mg  20 mg IntraVENous Q3H PRN    maalox/viscous lidocaine/diphenhydramine (GI COCKTAIL) oral solution 30 mL  30 mL Oral Q4H PRN       Review of Systems:   A comprehensive review of systems was negative except for that written in the HPI. Objective:   Physical Exam:     Visit Vitals  /70 (BP 1 Location: Left upper arm, BP Patient Position: At rest)   Pulse (!) 57   Temp 98.7 °F (37.1 °C)   Resp 17   Ht 5' 8\" (1.727 m)   Wt 72.6 kg (160 lb)   SpO2 96%   BMI 24.33 kg/m²      O2 Device: None (Room air)    Temp (24hrs), Av.5 °F (36.9 °C), Min:98.2 °F (36.8 °C), Max:98.7 °F (37.1 °C)    No intake/output data recorded. 1901 -  0700  In: 3496.2 [I.V.:3496.2]  Out: 0960 [Urine:1675]    General:   Awake and alert   Lungs:   Clear to auscultation bilaterally. Chest wall:  No tenderness or deformity. Heart:  Regular rate and rhythm, S1, S2 normal, no murmur, click, rub or gallop. Abdomen:   Soft, non-tender. Bowel sounds normal. No masses,  No organomegaly. Extremities: Extremities normal, atraumatic, no cyanosis or edema. Pulses: 2+ and symmetric all extremities. Skin: Skin color, texture, turgor normal. No rashes or lesions   Neurologic: CNII-XII intact.   No gross focal deficits         Data Review:       Recent Days:  Recent Labs     21  0455 21  1130   WBC 11.4* 9.8   HGB 12.7 14.8   HCT 36.6 41.1   PLT 213 209     Recent Labs     07/25/21  0455 07/24/21  1130    139   K 3.6 4.0    99   CO2 30 26   * 122*   BUN 14 10   CREA 1.16 1.08   CA 8.6 9.6   ALB 3.7 4.4   TBILI 0.7 0.6   ALT 15 17     No results for input(s): PH, PCO2, PO2, HCO3, FIO2 in the last 72 hours. 24 Hour Results:  No results found for this or any previous visit (from the past 24 hour(s)). US GALLBLADDER   Final Result   Cholelithiasis without cystitis or biliary dilatation. CTA CHEST ABD PELV W CONT   Final Result      1. No evidence of aneurysm or dissection. 2. Cholelithiasis without evidence of cholecystitis. 3. Moderate changed fecal material.            CT dose reduction was achieved through use of a standardized protocol tailored   for this examination and automatic exposure control for dose modulation. XR CHEST SNGL V   Final Result   No findings of acute cardiopulmonary abnormality. Assessment:  Hypertensive urgency, improved    Epigastric pain, possible peptic ulcer disease    Chronic hepatitis C    Hypothyroidism      Plan:  Await EGD      Care Plan discussed with: Patient/Family    Disposition: To be determined, possible discharge later today    Total time spent with patient: 30 minutes.     Miguel Pfeiffer MD

## 2021-07-26 NOTE — ED PROVIDER NOTES
EMERGENCY DEPARTMENT HISTORY AND PHYSICAL EXAM      Date: 7/24/2021  Patient Name: Anthony Chung    History of Presenting Illness     Chief Complaint   Patient presents with    Epigastric Pain       History Provided By: Patient    HPI: Anthony Chung, 72 y.o. male with a past medical history significant Hep C presents to the ED with cc of severe epigastric pain. 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 Pain kept him up last night. He denies alcohol use. He denies NVD or constipation. He denies CP, SOB, or known sick contacts. Pt also noticed his BP was elevated. He noted his usual BP is low 100s due to his running history. Today he was in the 220s. He has been belching since this pain began as well. There are no other complaints, changes, or physical findings at this time.     PCP: Abebe Del Rosario MD    Current Facility-Administered Medications   Medication Dose Route Frequency Provider Last Rate Last Admin    traZODone (DESYREL) tablet 100 mg  100 mg Oral QHS PRN Bon Aviles MD        levothyroxine (SYNTHROID) tablet 125 mcg  125 mcg Oral ACB Porsha Orozco MD   125 mcg at 07/25/21 1014    nicotine (NICODERM CQ) 21 mg/24 hr patch 1 Patch  1 Patch TransDERmal DAILY Porsha Orozco MD        lisinopriL (PRINIVIL, ZESTRIL) tablet 10 mg  10 mg Oral DAILY Porsha Orozco MD   10 mg at 07/25/21 1015    Or    hydroCHLOROthiazide (HYDRODIURIL) tablet 12.5 mg  12.5 mg Oral DAILY Porsha Orozco MD        sucralfate (CARAFATE) tablet 1 g  1 g Oral Q6H Porsha Orozco MD   1 g at 07/25/21 2327    morphine injection 1 mg  1 mg IntraVENous Q2H PRN Porsha Orozco MD   1 mg at 07/25/21 2235    sodium chloride (NS) flush 5-40 mL  5-40 mL IntraVENous Q8H Bon Aviles MD   10 mL at 07/25/21 2237    sodium chloride (NS) flush 5-40 mL  5-40 mL IntraVENous PRN Bon Aviles MD        0.9% sodium chloride infusion  100 mL/hr IntraVENous CONTINUOUS Miko Judge  mL/hr at 07/25/21 1849 100 mL/hr at 07/25/21 1849    acetaminophen (TYLENOL) solution 650 mg  650 mg Oral Q6H PRN Miko Judge MD        ondansetron [facility]) injection 4 mg  4 mg IntraVENous Q4H PRN Miko Judge MD   4 mg at 07/25/21 1942    [Held by provider] enoxaparin (LOVENOX) injection 40 mg  40 mg SubCUTAneous Q24H Miko Judge MD   40 mg at 07/24/21 2044    nitroglycerin (Tridil) 200 mcg/ml infusion  0-20 mcg/min IntraVENous TITRATE Miko Judge MD   Held at 07/25/21 0400    labetaloL (NORMODYNE;TRANDATE) 20 mg/4 mL (5 mg/mL) injection 20 mg  20 mg IntraVENous Q3H PRN Miko Judge MD        maalox/viscous lidocaine/diphenhydramine (GI COCKTAIL) oral solution 30 mL  30 mL Oral Q4H PRN Shea Morton MD   30 mL at 07/25/21 2329       Past History     Past Medical History:  Past Medical History:   Diagnosis Date    GERD (gastroesophageal reflux disease)     Hepatitis C     Hypertension     Hypothyroidism     Hypothyroidism     Porphyria ([address]        Past Surgical History:  No past surgical history on file. Family History:  Family History   Problem Relation Age of Onset    Other Father         possible porphyria    Other Other         nephew       Social History:  Social History     Tobacco Use    Smoking status: Current Every Day Smoker    Tobacco comment: pt states he smokes one cigar a day   Substance Use Topics    Alcohol use: No    Drug use: No       Allergies: Allergies   Allergen Reactions    Codeine Itching    Percocet [Oxycodone-Acetaminophen] Itching         Review of Systems   Review of Systems   Constitutional: Negative. HENT: Negative. Eyes: Negative. Respiratory: Negative. Cardiovascular: Negative. Gastrointestinal: Positive for abdominal pain. Genitourinary: Negative. Musculoskeletal: Negative. Skin: Negative. Neurological: Negative. Psychiatric/Behavioral: Negative. All other systems reviewed and are negative. Physical Exam   Physical Exam  Constitutional:       Comments: Due to pain   HENT:      Mouth/Throat:      Mouth: Mucous membranes are moist.   Eyes:      Extraocular Movements: Extraocular movements intact. Pupils: Pupils are equal, round, and reactive to light. Cardiovascular:      Rate and Rhythm: Normal rate and regular rhythm. Comments: Severe hypertension SBP over 220 (BP BOTH ARMS CHECKED AND SAME BETWEEN EACH ARM- NO DISCREPANCY)  Pulmonary:      Effort: Pulmonary effort is normal.      Breath sounds: Normal breath sounds. Abdominal:      General: There is no distension. Palpations: Abdomen is soft. There is no mass. Tenderness: There is no abdominal tenderness. There is no guarding. Comments: belching   Musculoskeletal:         General: Normal range of motion. Skin:     General: Skin is warm. Diagnostic Study Results     Labs -Lab Results        LIPASE (Final result)   Component (Lab Inquiry)  Collection Time Result Time LPSE   07/24/21 15:00:00 07/24/21 15:16:54 132         Final result                          Contains abnormal data D DIMER (Final result)   Component (Lab Inquiry)  Collection Time Result Time DDIMSQ   07/24/21 11:43:00 07/24/21 12:28:47 0.52   The combination of a leda Pacheco Valentino [place] High        Final result                        TROPONIN I (Final result)   Component (Lab Inquiry)  Collection Time Result Time TROIQ   07/24/21 11:30:00 07/24/21 12:05:52 <0.05   The presence of detec. ..         Final result                          Contains abnormal data CBC WITH AUTOMATED DIFF (Final result)   Component (Lab Inquiry)  Collection Time Result Time WBC RBC HGB HCT MCV   07/24/21 11:30:00 07/24/21 11:58:02 9.8 4.54 14.8 41.1 90.5       Collection Time Result Time MCH MCHC RDW PLT MPLV   07/24/21 11:30:00 07/24/21 11:58:02 32.6 36.0 11.7 209 8.8Low        Collection Time Result Time GRANS LYMPH MONOS EOS BASOS   07/24/21 11:30:00 07/24/21 11:58:02 85High  8Low  7 0 0       Collection Time Result Time IG ABG ABL ABM TIFFANY   07/24/21 11:30:00 07/24/21 11:58:02 0 8.2High  0.8 0.7 0.0       Collection Time Result Time ABB AIG DF   07/24/21 11:30:00 07/24/21 11:58:02 0.0 0.0 AUTOMATED       Final result                        Contains abnormal data METABOLIC PANEL, COMPREHENSIVE (Final result)   Component (Lab Inquiry)  Collection Time Result Time NA K CL CO2 AGAP   07/24/21 11:30:00 07/24/21 12:05:52 139 4.0 99 26 14       Collection Time Result Time GLU BUN CREA BUCR GFRAA   07/24/21 11:30:00 07/24/21 12:05:52 122High  10 1.08 9Low  >60       Collection Time Result Time GFRNA CA TBIL SGOT GPT   07/24/21 11:30:00 07/24/21 12:05:52 >60   Estimated GFR is calcu. .. 9.6 0.6 17 17       Collection Time Result Time AP TP ALB GLOB AGRAT   07/24/21 11:30:00 07/24/21 12:05:52 40Low  8. 3High  4.4 3.9 1.1       Final result             Radiologic Studies -   US GALLBLADDER   Final Result   Cholelithiasis without cystitis or biliary dilatation. CTA CHEST ABD PELV W CONT   Final Result      1. No evidence of aneurysm or dissection. 2. Cholelithiasis without evidence of cholecystitis. 3. Moderate changed fecal material.            CT dose reduction was achieved through use of a standardized protocol tailored   for this examination and automatic exposure control for dose modulation. XR CHEST SNGL V   Final Result   No findings of acute cardiopulmonary abnormality. CT Results  (Last 48 hours)               07/24/21 1324  CTA CHEST ABD PELV W CONT Final result    Impression:      1. No evidence of aneurysm or dissection. 2. Cholelithiasis without evidence of cholecystitis. 3. Moderate changed fecal material.               CT dose reduction was achieved through use of a standardized protocol tailored   for this examination and automatic exposure control for dose modulation. Narrative:  CT CHEST, ABDOMEN AND PELVIS       INDICATION: Acute onset epigastric pain, elevated blood pressure, concern for   dissection   COMPARISON:None. TECHNIQUE: Multiple contiguous axial CT images of the chest, abdomen and pelvis   were obtained following administration of  intravenous (100 mL Isovue-370)   contrast material. Image data reconstructed in the coronal and sagittal plane. Axial MIPS of the chest were also obtained. Findings:    Chest:       Great vessels: Normal without filling defect. . Normal without aneurismal   dilatation or dissection. The great vessels have a normal configuration off the   aortic arch and are widely patent. Heart: Normal without pericardial effusion. The coronary arteries have a normal   origin without obvious soft or calcific plaque. Lungs: Clear without pleural effusion, infiltrate or pneumothorax. No   suspicious pulmonary nodules are present. The large and small airways are clear   without endobronchial lesion or debris. Lymph nodes: There is no pathologically enlarged mediastinal, hilar, or axillary   lymphadenopathy. Esophagus: Normal without hiatal hernia. Thyroid: The visualized portions of the thyroid are normal.           ABDOMEN/PELVIS:   Liver: Within normal limits. Gallbladder/Biliary: Gallbladder is at upper limits of normal for size, there   are multiple gallstones. No evidence of pericholecystic fluid or gallbladder   wall thickening. Spleen:Within normal limits. Pancreas:Within normal limits. Adrenals:Within normal limits. Kidneys:Within normal limits. Peritoneum/Mesenteries:Within normal limits. Gastrointestinal tract:Within normal limits. No dilated loops of bowel or mural   thickening. There is moderate retained fecal material. The appendix is not   confidently identified. Radiopaque, likely previously ingested material is noted   within the bowel   Vascular: Within normal limits. Ureters:Within normal limits. Bladder:Within normal limits. Reproductive System: Within normal limits. MSK: No aggressive appearing osteolytic or blastic lesions in the visualized   skeleton. CXR Results  (Last 48 hours)               07/24/21 1151  XR CHEST SNGL V Final result    Impression:  No findings of acute cardiopulmonary abnormality. Narrative:  Examination: XR CHEST SNGL V        History: Chest Pain       Comparison: None available. FINDINGS:       Single frontal portable view of the chest. Symmetric lung volumes without focal   airspace consolidation, pneumothorax, or significant pleural effusion. Cardiac   silhouette is normal in size. Tortuosity of the thoracic aorta. No acute or   aggressive osseous abnormalities are evident. Jennifer    Medical Decision Making and ED Course   I am the first provider for this patient. I reviewed the vital signs, available nursing notes, past medical history, past surgical history, family history and social history. Vital Signs-Reviewed the patient's vital signs. 1042 98.2 °F (36.8 °C) 82 216/97 137 16 99 %         EKG interpretation: (Preliminary) 10:39 am  Rhythm: normal sinus rhythm; and regular . Rate (approx.): 70; Axis: normal; [place] interval: normal; QRS interval: normal ; ST/T wave: normal; Other findings: normal.    Records Reviewed: Nursing Notes    Provider Notes (Medical Decision Making): The patient presents with abdominal pain with a differential diagnosis of AAA, abdominal pain, biliary colic, cholecystitis, gastroenteritis and pancreatitis. Pt's severe acute onset of pain and hypertension are concerning for possible aortic dissection. Will obtain labs, and planned CTA abd pelvis. Will also work on pain control and BP management. Biliary colic, pancreatitis or obstructing galls tone other considerations.  Pt's elevated BP seems out of proportion to a pain response and concerned there is another etiology. ED Course:   Initial assessment performed. The patients presenting problems have been discussed, and they are in agreement with the care plan formulated and outlined with them. I have encouraged them to ask questions as they arise throughout their visit. ED Course as of Jul 26 0129   Sat Jul 24, 2021   1409 Patient came back from CT and pain was significant again decision was made to switch courses and try IV Dilaudid. At this time about 30 minutes after receiving Dilaudid patient does endorse mild discomfort but improvement from prior to initiating therapy change. Patient was informed that we are waiting on CTA scan results and reviewed what we would be looking for in that scan. He endorsed understanding. Blood pressure was returning to be elevated again will discuss with nursing to start titrating nitro drip again. [LG]   [id number] Spoke with radiologist who confirmed no dissection. She did not have a good explanation for patient's pain. She did note that there were gallbladders however gallbladder wall did not look thickened or inflamed or any obvious obstruction. [LG]   8898 Patient reassessed pain is tolerable at this time but still present. Patient was given updated report on his CTA. Discussed need for admission given the nitro drip. Explained will discuss with hospitalist obtaining ultrasound prior to transport or at [facility].    [LG]      ED Course User Index  [LG] Rik Abreu MD       CONSULT NOTE:     Dr Courtney Martinez spoke with Dr Stephanie Tellez  Specialty: Hospitalist  Discussed pt's hx, disposition, and available diagnostic and imaging results. Reviewed care plans. Consultant accepts pt for admission.           Procedures       CRITICAL CARE NOTE :  1:29 AM  Amount of Critical Care Time: __98__(minutes)__    IMPENDING DETERIORATION -Cardiovascular  ASSOCIATED RISK FACTORS - hypertension  MANAGEMENT- Bedside Assessment and Supervision of Care, transfer  INTERPRETATION - Xrays, CT Scan, ECG and Blood Pressure  INTERVENTIONS - hemodynamic mngmt, Ntiro drip  CASE REVIEW - Hospitalist/Intensivist, Nursing and Family  TREATMENT RESPONSE -Stable  PERFORMED BY - Self    NOTES   :  I have spent critical care time involved in lab review, consultations with specialist, family decision- making, bedside attention and documentation. This time excludes time spent in any separate billed procedures. During this entire length of time I was immediately available to the patient . Torsten Avelar MD        Disposition       Admitted      Diagnosis     Clinical Impression:     ICD-10-CM ICD-9-CM    1. Hypertensive urgency  I16.0 401.9    2. Abdominal pain, epigastric  R10.13 789.06        Attestations:    Torsten Avelar MD    Please note that this dictation was completed with [employer], the computer voice recognition software. Quite often unanticipated grammatical, syntax, homophones, and other interpretive errors are inadvertently transcribed by the computer software. Please disregard these errors. Please excuse any errors that have escaped final proofreading. Thank you.

## 2021-07-26 NOTE — ANESTHESIA PREPROCEDURE EVALUATION
Relevant Problems   CARDIOVASCULAR   (+) Hypertensive emergency   (+) Hypertensive urgency      GASTROINTESTINAL   (+) Chronic hepatitis C (HCC)      ENDOCRINE   (+) Hypothyroidism       Anesthetic History   No history of anesthetic complications            Review of Systems / Medical History  Patient summary reviewed, nursing notes reviewed and pertinent labs reviewed    Pulmonary          Smoker         Neuro/Psych              Cardiovascular    Hypertension                   GI/Hepatic/Renal     GERD      Liver disease (hep C)     Endo/Other      Hypothyroidism      Comments: SIRS Other Findings   Comments: Porphyria          Physical Exam    Airway  Mallampati: II  TM Distance: > 6 cm  Neck ROM: normal range of motion   Mouth opening: Normal     Cardiovascular    Rhythm: regular  Rate: normal         Dental      Comments: Dental implants   Pulmonary      Decreased breath sounds           Abdominal         Other Findings            Anesthetic Plan    ASA: 3, emergent  Anesthesia type: total IV anesthesia          Induction: Intravenous  Anesthetic plan and risks discussed with: Patient

## 2021-07-26 NOTE — ANESTHESIA POSTPROCEDURE EVALUATION
Procedure(s):  ESOPHAGOGASTRODUODENOSCOPY (EGD).     total IV anesthesia    Anesthesia Post Evaluation        Patient location during evaluation: ICU  Patient participation: complete - patient participated  Level of consciousness: awake and alert  Pain management: adequate  Airway patency: patent  Anesthetic complications: no  Cardiovascular status: acceptable and hemodynamically stable  Respiratory status: acceptable  Hydration status: acceptable  Post anesthesia nausea and vomiting:  controlled  Final Post Anesthesia Temperature Assessment:  Normothermia (36.0-37.5 degrees C)      INITIAL Post-op Vital signs:   Vitals Value Taken Time   /58 07/26/21 1256   Temp     Pulse 53 07/26/21 1256   Resp 17 07/26/21 1256   SpO2 99 % 07/26/21 1256

## 2021-07-26 NOTE — PROGRESS NOTES
Pt IV's discontinued. Pt taken off monitor. Pt vital signs WNL. Pt discharge instructions and medications reviewed with pt. Pt verbalized understanding. Pt PCP office to contact pt about f/u transition of care appt. Pt taken down to private vehicle via wheelchair.

## 2021-07-26 NOTE — DISCHARGE SUMMARY
Physician Discharge Summary     Patient ID:    Anthony Chung  [id number]  72 y.o.  1955    Admit date: 7/24/2021    Discharge date : 7/26/2021    Chronic Diagnoses:    Problem List as of 7/26/2021 Date Reviewed: 4/5/2016        Codes Class Noted - Resolved    Hypertensive urgency ICD-10-CM: I16.0  ICD-9-CM: 401.9  7/24/2021 - Present        Hypertensive emergency ICD-10-CM: I16.1  ICD-9-CM: 401.9  7/24/2021 - Present        Chronic hepatitis C ([facility] ICD-10-CM: B18.2  ICD-9-CM: 070.54  4/5/2016 - Present        Cellulitis ICD-10-CM: L03.90  ICD-9-CM: 682.9  7/2/2013 - Present        Hypothyroidism (Chronic) ICD-10-CM: E03.9  ICD-9-CM: 244.9  7/2/2013 - Present        Fever, unspecified ICD-10-CM: R50.9  ICD-9-CM: 780.60  7/2/2013 - Present        Leukopenia ICD-10-CM: D72.819  ICD-9-CM: 288.50  7/2/2013 - Present        SIRS (systemic inflammatory response syndrome) ([facility] ICD-10-CM: R65.10  ICD-9-CM: 995.90  7/2/2013 - Present          22    Final Diagnoses:   Hypertensive urgency   Acute gastritis  Chronic hepatitis C   hypothyroidism         Reason for Hospitalization:   He is a 60-year-old male, very healthy and physically active who was admitted on 7/24 with severe epigastric pain and hypertensive urgency, requiring IV nitroglycerin. CTA of the chest and abdomen was negative for aneurysm but did show gallstones. Ultrasound was negative for cholecystitis      Hospital Course:   Patient was admitted to ICU. He was placed on Carafate and IV Protonix he was weaned off the IV nitroglycerin within 12 hours and resumed on his usual home blood pressure medications        He continued to complain of severe epigastric pain which seemed out of proportion to physical findings. He really had no abdominal tenderness to speak of    He was seen by GI and underwent upper endoscopy on 7/26 which showed only gastritis  He was felt stable for discharge home on 7/26.   I will change him from his home Prilosec to Protonix twice daily along with Carafate                  Discharge Medications:   Current Discharge Medication List      START taking these medications    Details   sucralfate (CARAFATE) 1 gram tablet Take 1 Tablet by mouth every six (6) hours for 15 days. Qty: 60 Tablet, Refills: 0  Start date: 7/26/2021, End date: 8/10/2021      pantoprazole (Protonix) 40 mg tablet Take 1 Tablet by mouth two (2) times a day. Qty: 60 Tablet, Refills: 0  Start date: 7/26/2021         CONTINUE these medications which have NOT CHANGED    Details   lisinopril-hydroCHLOROthiazide (PRINZIDE, ZESTORETIC) 10-12.5 mg per tablet Take 1 Tablet by mouth daily. traZODone (DESYREL) 50 mg tablet Take 100 mg by mouth nightly as needed for Sleep.      levothyroxine (SYNTHROID) 125 mcg tablet Take 125 mcg by mouth Daily (before breakfast). hydroxychloroquine (PLAQUENIL) 200 mg tablet Take 200 mg by mouth two (2) days a week. STOP taking these medications       omeprazole (PRILOSEC) 20 mg capsule Comments:   Reason for Stopping: Follow up Care:    1. Brittany Miller MD in 1-2 weeks. Please call to set up an appointment shortly after discharge. Diet:  Regular Diet    Disposition:  Home. Advanced Directive:   FULL    DNR      Discharge Exam:  General:  Alert, cooperative, no distress, appears stated age. Lungs:   Clear to auscultation bilaterally. Chest wall:  No tenderness or deformity. Heart:  Regular rate and rhythm, S1, S2 normal, no murmur, click, rub or gallop. Abdomen:   Soft, non-tender. Bowel sounds normal. No masses,  No organomegaly. Extremities: Extremities normal, atraumatic, no cyanosis or edema. Pulses: 2+ and symmetric all extremities. Skin: Skin color, texture, turgor normal. No rashes or lesions   Neurologic: CNII-XII intact.  No gross sensory or motor deficits        CONSULTATIONS: GI    Significant Diagnostic Studies:   7/24/2021: BUN 10 mg/dL (Ref range: 6 - 20 mg/dL); Calcium 9.6 mg/dL (Ref range: 8.5 - 10.1 mg/dL); CO2 26 mmol/L (Ref range: 21 - 32 mmol/L); Creatinine 1.08 mg/dL (Ref range: 0.70 - 1.30 mg/dL); Glucose 122 mg/dL* (Ref range: 65 - 100 mg/dL); HCT 41.1 % (Ref range: 36.6 - 50.3 %); HGB 14.8 g/dL (Ref range: 12.1 - 17.0 g/dL); Potassium 4.0 mmol/L (Ref range: 3.5 - 5.1 mmol/L); Sodium 139 mmol/L (Ref range: 136 - 145 mmol/L)  7/25/2021: BUN 14 mg/dL (Ref range: 6 - 20 mg/dL); Calcium 8.6 mg/dL (Ref range: 8.5 - 10.1 mg/dL); CO2 30 mmol/L (Ref range: 21 - 32 mmol/L); Creatinine 1.16 mg/dL (Ref range: 0.70 - 1.30 mg/dL); Glucose 103 mg/dL* (Ref range: 65 - 100 mg/dL); HCT 36.6 % (Ref range: 36.6 - 50.3 %); HGB 12.7 g/dL (Ref range: 12.1 - 17.0 g/dL); Potassium 3.6 mmol/L (Ref range: 3.5 - 5.1 mmol/L); Sodium 137 mmol/L (Ref range: 136 - 145 mmol/L)  Recent Labs     07/25/21  0455 07/24/21  1130   WBC 11.4* 9.8   HGB 12.7 14.8   HCT 36.6 41.1    209     Recent Labs     07/25/21  0455 07/24/21  1130    139   K 3.6 4.0    99   CO2 30 26   BUN 14 10   CREA 1.16 1.08   * 122*   CA 8.6 9.6     Recent Labs     07/25/21  0455 07/24/21  1500 07/24/21  1130   ALT 15  --  17   AP 33*  --  40*   TBILI 0.7  --  0.6   TP 7.0  --  8.3*   ALB 3.7  --  4.4   GLOB 3.3  --  3.9   LPSE  --  132  --      No results for input(s): INR, PTP, APTT, INREXT in the last 72 hours. No results for input(s): FE, TIBC, PSAT, FERR in the last 72 hours. No results for input(s): PH, PCO2, PO2 in the last 72 hours. No results for input(s): CPK, CKMB in the last 72 hours.     No lab exists for component: TROPONINI  No results found for: [facility]    Discharge time spent 35 minutes    Signed:  lAexandra Chowdhury MD  7/26/2021  2:39 PM

## 2021-08-04 ENCOUNTER — TRANSCRIBE ORDER (OUTPATIENT)
Dept: SCHEDULING | Age: 66
End: 2021-08-04

## 2021-08-04 DIAGNOSIS — R10.13 EPIGASTRIC PAIN: Primary | ICD-10-CM

## 2021-08-09 ENCOUNTER — HOSPITAL ENCOUNTER (OUTPATIENT)
Dept: NUCLEAR MEDICINE | Age: 66
Discharge: HOME | End: 2021-08-09
Attending: FAMILY MEDICINE
Payer: COMMERCIAL

## 2021-08-09 DIAGNOSIS — R10.13 EPIGASTRIC PAIN: ICD-10-CM

## 2021-08-09 PROCEDURE — 78226 HEPATOBILIARY SYSTEM IMAGING: CPT

## 2021-08-09 PROCEDURE — 78227 HEPATOBIL SYST IMAGE W/DRUG: CPT

## 2021-08-09 RX ORDER — KIT FOR THE PREPARATION OF TECHNETIUM TC 99M MEBROFENIN 45 MG/10ML
6 INJECTION, POWDER, LYOPHILIZED, FOR SOLUTION INTRAVENOUS
Status: COMPLETED | OUTPATIENT
Start: 2021-08-09 | End: 2021-08-09

## 2021-08-09 RX ADMIN — KIT FOR THE PREPARATION OF TECHNETIUM TC 99M MEBROFENIN 6.47 MILLICURIE: 45 INJECTION, POWDER, LYOPHILIZED, FOR SOLUTION INTRAVENOUS at 08:00

## (undated) DEVICE — ENDO KIT 1: Brand: MEDLINE INDUSTRIES, INC.

## (undated) DEVICE — CANNULA NSL O2 AD 7 FT END-TIDAL CARBON DIOX VENTFLO

## (undated) DEVICE — MOUTHPIECE ENDOSCP 20X27MM --

## (undated) DEVICE — FORCEPS BX L240CM JAW DIA2.8MM L CAP W/ NDL MIC MESH TOOTH